# Patient Record
Sex: FEMALE | Race: WHITE | URBAN - METROPOLITAN AREA
[De-identification: names, ages, dates, MRNs, and addresses within clinical notes are randomized per-mention and may not be internally consistent; named-entity substitution may affect disease eponyms.]

---

## 2022-03-18 PROBLEM — G83.4 CAUDA EQUINA SYNDROME (HCC): Status: ACTIVE | Noted: 2017-11-07

## 2022-03-19 PROBLEM — R94.6 ABNORMAL THYROID FUNCTION TEST: Status: ACTIVE | Noted: 2019-12-02

## 2022-03-19 PROBLEM — E23.0 PITUITARY INSUFFICIENCY (HCC): Status: ACTIVE | Noted: 2020-02-05

## 2022-04-19 PROBLEM — R55 SYNCOPE AND COLLAPSE: Status: ACTIVE | Noted: 2022-04-19

## 2022-06-15 DIAGNOSIS — E03.8 CENTRAL HYPOTHYROIDISM: Primary | ICD-10-CM

## 2022-08-16 LAB
ALT SERPL-CCNC: 30 U/L (ref 12–65)
ANION GAP SERPL CALC-SCNC: 9 MMOL/L (ref 7–16)
BASOPHILS # BLD: 0 K/UL (ref 0–0.2)
BASOPHILS NFR BLD: 0 % (ref 0–2)
BUN SERPL-MCNC: 10 MG/DL (ref 8–23)
CALCIUM SERPL-MCNC: 9.4 MG/DL (ref 8.3–10.4)
CHLORIDE SERPL-SCNC: 95 MMOL/L (ref 98–107)
CO2 SERPL-SCNC: 24 MMOL/L (ref 21–32)
CREAT SERPL-MCNC: 0.9 MG/DL (ref 0.6–1)
DIFFERENTIAL METHOD BLD: ABNORMAL
EOSINOPHIL # BLD: 0.1 K/UL (ref 0–0.8)
EOSINOPHIL NFR BLD: 1 % (ref 0.5–7.8)
ERYTHROCYTE [DISTWIDTH] IN BLOOD BY AUTOMATED COUNT: 12.8 % (ref 11.9–14.6)
GLUCOSE SERPL-MCNC: 138 MG/DL (ref 65–100)
HCT VFR BLD AUTO: 37.5 % (ref 35.8–46.3)
HGB BLD-MCNC: 12.6 G/DL (ref 11.7–15.4)
IMM GRANULOCYTES # BLD AUTO: 0 K/UL (ref 0–0.5)
IMM GRANULOCYTES NFR BLD AUTO: 0 % (ref 0–5)
LYMPHOCYTES # BLD: 1.5 K/UL (ref 0.5–4.6)
LYMPHOCYTES NFR BLD: 21 % (ref 13–44)
MCH RBC QN AUTO: 30.2 PG (ref 26.1–32.9)
MCHC RBC AUTO-ENTMCNC: 33.6 G/DL (ref 31.4–35)
MCV RBC AUTO: 89.9 FL (ref 79.6–97.8)
MONOCYTES # BLD: 0.4 K/UL (ref 0.1–1.3)
MONOCYTES NFR BLD: 6 % (ref 4–12)
NEUTS SEG # BLD: 5.1 K/UL (ref 1.7–8.2)
NEUTS SEG NFR BLD: 72 % (ref 43–78)
NRBC # BLD: 0 K/UL (ref 0–0.2)
PLATELET # BLD AUTO: 412 K/UL (ref 150–450)
PMV BLD AUTO: 9.2 FL (ref 9.4–12.3)
POTASSIUM SERPL-SCNC: 4.9 MMOL/L (ref 3.5–5.1)
RBC # BLD AUTO: 4.17 M/UL (ref 4.05–5.2)
SODIUM SERPL-SCNC: 128 MMOL/L (ref 136–145)
WBC # BLD AUTO: 7.2 K/UL (ref 4.3–11.1)

## 2022-08-17 LAB
CARBAMAZEPINE FREE SERPL-MCNC: 2.5 UG/ML (ref 0.6–4.2)
CARBAMAZEPINE, TOTAL: 12.2 UG/ML (ref 4–12)

## 2022-08-18 ENCOUNTER — OFFICE VISIT (OUTPATIENT)
Dept: CARDIOLOGY CLINIC | Age: 62
End: 2022-08-18
Payer: COMMERCIAL

## 2022-08-18 VITALS
HEIGHT: 63 IN | BODY MASS INDEX: 32.76 KG/M2 | HEART RATE: 62 BPM | DIASTOLIC BLOOD PRESSURE: 70 MMHG | SYSTOLIC BLOOD PRESSURE: 122 MMHG | WEIGHT: 184.9 LBS

## 2022-08-18 DIAGNOSIS — I10 ESSENTIAL HYPERTENSION: ICD-10-CM

## 2022-08-18 DIAGNOSIS — E78.01 FAMILIAL HYPERCHOLESTEROLEMIA: ICD-10-CM

## 2022-08-18 DIAGNOSIS — E78.01 FAMILIAL HYPERCHOLESTEROLEMIA: Primary | ICD-10-CM

## 2022-08-18 DIAGNOSIS — I95.1 ORTHOSTATIC HYPOTENSION: ICD-10-CM

## 2022-08-18 DIAGNOSIS — E87.1 HYPONATREMIA SYNDROME: ICD-10-CM

## 2022-08-18 LAB
CHOLEST SERPL-MCNC: 225 MG/DL
HDLC SERPL-MCNC: 89 MG/DL (ref 40–60)
HDLC SERPL: 2.5 {RATIO}
LDLC SERPL CALC-MCNC: 113 MG/DL
TRIGL SERPL-MCNC: 115 MG/DL (ref 35–150)
VLDLC SERPL CALC-MCNC: 23 MG/DL (ref 6–23)

## 2022-08-18 PROCEDURE — 99214 OFFICE O/P EST MOD 30 MIN: CPT | Performed by: INTERNAL MEDICINE

## 2022-08-18 RX ORDER — ATORVASTATIN CALCIUM 80 MG/1
80 TABLET, FILM COATED ORAL DAILY
Qty: 90 TABLET | Refills: 3 | Status: SHIPPED | OUTPATIENT
Start: 2022-08-18

## 2022-08-18 RX ORDER — GLIPIZIDE 5 MG/1
5 TABLET ORAL
COMMUNITY
Start: 2022-06-27

## 2022-08-18 ASSESSMENT — ENCOUNTER SYMPTOMS: SHORTNESS OF BREATH: 0

## 2022-08-18 NOTE — PROGRESS NOTES
[facility] CARDIOLOGY  [address], [address]  PHONE: [phone]      22    NAME:  Jun Camarena  : 1960  MRN: [id number]         SUBJECTIVE:   Jun Camarena is a 57 y.o. female seen for a follow up visit regarding the following:     Chief Complaint   Patient presents with    Results     echo            HPI:  Follow up  Results (echo)   . Returns to follow up syncope d/t severe OH with abnormal spinal reflexes, familial hyperlipidemia. She's been good overall. She had one more episode since last visit, felt it coming on and was able to sit down. Wearing compression socks at home, walked a few minutes        PAST CARDIAC HISTORY:   2022- normal NST and EF   30 day monitor - normal tracing. Symptoms correlate with NSR   Echo - normal SF, valves. ind DF  Severe orthostatic hypotension d/t abnormal spinal reflexes/cauda equina syndrome             Key CAD CHF Meds            atorvastatin (LIPITOR) 80 MG tablet (Taking)    Sig - Route: Take 1 tablet by mouth daily - Oral    amLODIPine (NORVASC) 5 MG tablet (Taking)    Class: Historical Med    atenolol (TENORMIN) 50 MG tablet (Taking)    Class: Historical Med              Past Medical History, Past Surgical History, Family history, Social History, and Medications were all reviewed with the patient today and updated as necessary. Prior to Admission medications    Medication Sig Start Date End Date Taking?  Authorizing Provider   glipiZIDE (GLUCOTROL) 5 MG tablet 5 mg 2 times daily (before meals) 22  Yes Historical Provider, MD   atorvastatin (LIPITOR) 80 MG tablet Take 1 tablet by mouth daily 22  Yes Sara Jacob MD   amLODIPine (NORVASC) 5 MG tablet Take 10 mg by mouth daily 20  Yes Ar Automatic Reconciliation   atenolol (TENORMIN) 50 MG tablet Take 50 mg by mouth daily 20  Yes Ar Automatic Reconciliation   buPROPion (WELLBUTRIN XL) 150 MG extended release tablet Take 300 mg by mouth 20  Yes Ar Automatic Reconciliation   carBAMazepine (TEGRETOL XR) 200 MG extended release tablet Take 200 mg by mouth 3 times daily   Yes Ar Automatic Reconciliation   escitalopram (LEXAPRO) 20 MG tablet Take 20 mg by mouth 3 times daily 20  Yes Ar Automatic Reconciliation   levothyroxine (SYNTHROID) 100 MCG tablet Take 100 mcg by mouth every morning (before breakfast) 11/3/21  Yes Ar Automatic Reconciliation   lidocaine (LIDODERM) 5 % Place onto the skin every 24 hours   Yes Ar Automatic Reconciliation   magnesium oxide (MAG-OX) 400 (240 Mg) MG tablet Take 400 mg by mouth daily   Yes Ar Automatic Reconciliation   melatonin 5 MG TABS tablet Take 10 mg by mouth   Yes Ar Automatic Reconciliation   promethazine (PHENERGAN) 25 MG tablet Take 25 mg by mouth every 6 hours as needed 20  Yes Ar Automatic Reconciliation   Rimegepant Sulfate (NURTEC) 75 MG TBDP 1 tablet every other day 21  Yes Ar Automatic Reconciliation   tiZANidine (ZANAFLEX) 4 MG capsule Take 4 mg by mouth 3 times daily 5/28/15  Yes Ar Automatic Reconciliation   valACYclovir (VALTREX) 500 MG tablet Take 500 mg by mouth 2 times daily 20  Yes Ar Automatic Reconciliation   vitamin D3 (CHOLECALCIFEROL) 125 MCG (5000 UT) TABS tablet Take 4,000 Units by mouth daily 14   Ar Automatic Reconciliation     Allergies   Allergen Reactions    Duloxetine Other (See Comments)     tachycardia    Theophylline Palpitations     Passed out last time taken     Past Medical History:   Diagnosis Date    Cauda equina syndrome (HCC)     Central hypothyroidism     Depression     Empty sella ([facility] [address]     History of tethered spinal cord     Hypercholesteremia     Hypertension     Migraine     Neurogenic bladder     Optic neuritis     Type 2 diabetes mellitus [facility])      Past Surgical History:   Procedure Laterality Date    BACK SURGERY  , 2014, and 2015     SECTION  1989    CHOLECYSTECTOMY  2011    KNEE ARTHROSCOPY Right 2011     Family History   Problem Relation Age of Onset    Heart Disease Mother     Hypertension Mother     Thyroid Cancer Neg Hx     Breast Cancer Neg Hx     Pacemaker Brother     Hypertension Brother     Stroke Brother     Thyroid Disease Sister         nodules treated with thyroidectomy     Alzheimer's Disease Father     Stroke Father     Hypertension Father     Heart Disease Father     Diabetes Father     Pacemaker Mother     Dementia Mother      Social History     Tobacco Use    Smoking status: Never    Smokeless tobacco: Never   Substance Use Topics    Alcohol use: Yes     Alcohol/week: 0.0 standard drinks       ROS:    Review of Systems   Cardiovascular:  Negative for chest pain. Respiratory:  Negative for shortness of breath. Neurological:  Positive for light-headedness. PHYSICAL EXAM:   /70   Pulse 62   Ht 5' 3\" (1.6 m)   Wt 184 lb 14.4 oz (83.9 kg) Comment: with shoes  BMI 32.75 kg/m²      Wt Readings from Last 3 Encounters:   08/18/22 184 lb 14.4 oz (83.9 kg)   05/18/22 175 lb (79.4 kg)   05/10/22 175 lb (79.4 kg)     BP Readings from Last 3 Encounters:   08/18/22 122/70   05/18/22 132/80   05/10/22 (!) 140/80     Pulse Readings from Last 3 Encounters:   08/18/22 62   05/18/22 62   04/05/22 72           Physical Exam  Constitutional:       Appearance: Normal appearance. HENT:      Head: Normocephalic and atraumatic. Eyes:      Conjunctiva/sclera: Conjunctivae normal.   Neck:      Vascular: No carotid bruit. Cardiovascular:      Rate and Rhythm: Normal rate and regular rhythm. Heart sounds: No murmur heard. No friction rub. No gallop. Pulmonary:      Effort: No respiratory distress. Breath sounds: No wheezing or rales. Musculoskeletal:         General: No swelling. Cervical back: Neck supple. Skin:     General: Skin is warm and dry. Neurological:      General: No focal deficit present. Mental Status: She is alert.    Psychiatric:         Mood and Affect: Mood normal.         Behavior: Behavior normal.       Medical problems and test results were reviewed with the patient today. DATA REVIEW      BMP  Lab Results   Component Value Date/Time     08/16/2022 10:33 AM    K 4.9 08/16/2022 10:33 AM    CL 95 08/16/2022 10:33 AM    CO2 24 08/16/2022 10:33 AM    BUN 10 08/16/2022 10:33 AM    CREATININE 0.90 08/16/2022 10:33 AM    GLUCOSE 138 08/16/2022 10:33 AM    CALCIUM 9.4 08/16/2022 10:33 AM          EKG        CXR/IMAGING        DEVICE INTERROGATION        OUTSIDE RECORDS REVIEW    Records from outside providers have been reviewed and summarized as noted in the HPI, past history and data review sections of this note, and reviewed with patient. .       ASSESSMENT and PLAN    Roseann Evans was seen today for results. Diagnoses and all orders for this visit:    Familial hypercholesterolemia  -     Lipid Panel; Future    Orthostatic hypotension    Essential hypertension    Hyponatremia syndrome    Other orders  -     atorvastatin (LIPITOR) 80 MG tablet; Take 1 tablet by mouth daily        IMPRESSION:    She's managed well, permissive hypertension to 150-160 if necessary to prevent significant drops with syncope. Continue conservative measures. She previously asked if I would help manage her familial dyslipidemia. Happy to help. Continue high intensity statin, check labs today. Very likely will require additional therapy. Results and recommendations via my chart    She reports chronic hyponatremia on tegretol. Return in about 6 months (around 2/18/2023). Thank you for allowing me to participate in this patient's care. Please call or contact me if there are any questions or concerns regarding the above.       Shefali Chaves MD  08/18/22  8:46 AM

## 2022-08-22 LAB — 25(OH)D3+25(OH)D2 SERPL-MCNC: 50.7 NG/ML (ref 30–100)

## 2022-10-27 ENCOUNTER — TELEPHONE (OUTPATIENT)
Dept: ENDOCRINOLOGY | Age: 62
End: 2022-10-27

## 2022-10-27 DIAGNOSIS — E03.8 CENTRAL HYPOTHYROIDISM: Primary | ICD-10-CM

## 2022-10-27 NOTE — TELEPHONE ENCOUNTER
I need to know if labs need done before my appointment next week and if so I need my lab order mailed to me.

## 2023-03-01 DIAGNOSIS — E03.8 CENTRAL HYPOTHYROIDISM: ICD-10-CM

## 2023-03-02 LAB — T4 FREE SERPL-MCNC: 0.9 NG/DL (ref 0.78–1.46)

## 2023-03-07 ENCOUNTER — OFFICE VISIT (OUTPATIENT)
Dept: ENDOCRINOLOGY | Age: 63
End: 2023-03-07
Payer: COMMERCIAL

## 2023-03-07 VITALS
WEIGHT: 189 LBS | SYSTOLIC BLOOD PRESSURE: 120 MMHG | OXYGEN SATURATION: 98 % | DIASTOLIC BLOOD PRESSURE: 80 MMHG | BODY MASS INDEX: 33.48 KG/M2 | HEART RATE: 96 BPM

## 2023-03-07 DIAGNOSIS — E03.8 CENTRAL HYPOTHYROIDISM: Primary | ICD-10-CM

## 2023-03-07 DIAGNOSIS — E11.65 TYPE 2 DIABETES MELLITUS WITH HYPERGLYCEMIA, WITHOUT LONG-TERM CURRENT USE OF INSULIN (HCC): ICD-10-CM

## 2023-03-07 PROCEDURE — 1123F ACP DISCUSS/DSCN MKR DOCD: CPT | Performed by: INTERNAL MEDICINE

## 2023-03-07 PROCEDURE — 99214 OFFICE O/P EST MOD 30 MIN: CPT | Performed by: INTERNAL MEDICINE

## 2023-03-07 RX ORDER — LEVOTHYROXINE SODIUM 0.12 MG/1
125 TABLET ORAL
Qty: 90 TABLET | Refills: 3 | Status: SHIPPED | OUTPATIENT
Start: 2023-03-07

## 2023-03-07 ASSESSMENT — ENCOUNTER SYMPTOMS
DIARRHEA: 1
CONSTIPATION: 0

## 2023-03-07 NOTE — PROGRESS NOTES
Nahed Simon MD, [address]            Reason for visit: Follow-up of secondary hypothyroidism      ASSESSMENT AND PLAN:    1. 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 Assessment of other anterior pituitary function was normal.  Free T4 is low-normal on levothyroxine 100 mcg daily. I will increase her levothyroxine dose as below. I will have her recheck free T4 in 6 weeks and then again prior to the next appointment with me. The adequacy of treatment should be monitored by checking free T4 only. TSH is unreliable and should not be checked, as it confuses the clinical picture. - levothyroxine (SYNTHROID) 125 MCG tablet; Take 1 tablet by mouth every morning (before breakfast)  Dispense: 90 tablet; Refill: 3  - T4, Free; Future  - T4, Free; Future    2. Type 2 diabetes mellitus with hyperglycemia, without long-term current use of insulin [facility])  Ms. Pedro Sommers has diabetes which appears to be well controlled (though her most recent hemoglobin A1c was 1 year ago). She currently takes glipizide. She is understandably frustrated about weight gain. Glipizide of course predisposes to weight gain (as well as hypoglycemia). It would be reasonable to replace glipizide with a weekly GLP-1 receptor agonist or daily SGLT2 inhibitor if she prefers to avoid injections. Defer to Dr. Derek Kim. Follow-up and Dispositions    Return in about 4 months (around 2023). History of Present Illness:    THYROID DYSFUNCTION  Kathryn Morfin is seen for follow-up of abnormal thyroid function tests, first noted in 2018 but brought to her attention in 2019. Her daughter Charlie Maki ( 1989) was evaluated by Dr. Ever Castellanos in this office for abnormal thyroid function tests. She was found to have low T3 uptake attributable to oral contraceptive therapy. She is biochemically euthyroid.      Current symptoms: See review of systems below     Prior treatment: Levothyroxine 50 mcg daily was started by Dr. Mariama Galeano 7/10/2019. Her dose was increased to 100 mcg daily 2019. Levothyroxine was discontinued 2019. Levothyroxine 100 mcg daily was resumed 2020. Pertinent labs:  2011: TSH 2.146, free T4 0.9.  2013: TSH 1.858.  10/23/2017: TSH 3.150.  2018: TSH 5.350.    10/16/2018: Free T4 0.83.  2019: TSH 2.920, T4 4.2, free thyroxine index 0.6, T3 uptake 15%. 2019: TSH 5.680, free T4 0.63, T3 uptake 17%. 9/10/2019: TSH 0.302, free T4 0.80, T3 uptake 18%. 2019: TSH 0.066, free T4 1.16, free T4 by dialysis 1.3, T4 8.0, T3 91, T3 uptake 23%, free thyroxine index 1.8, thyroid binding globulin 26, LH 54.7, .3, estradiol less than 5.0, prolactin 5.0.  2020: TSH 2.450, free T4 0.76, free T4 by dialysis 0.61.  3/10/2020 at 8:33 AM: TSH 2.450, T3 90, cortisol 25.6, ACTH 48.8, prolactin 11.2, insulin like growth factor I 119, estradiol less than 5.0, LH 46.4, FSH 98.0.  2020: Free T4 1.13.  2021: Free T4 0.89.  2021: Free T4 1.08.  10/29/2021: Free T4 0.94.  2022: Free T4 1.10.  3/1/2023: Free T4 0.9. [facility] Pituitary measures 1.5 x 1.2 x 0.5 cm. Partial empty sella. Unremarkable optic chiasm. Review of Systems   Constitutional:  Positive for fatigue and unexpected weight change (gained 14 pounds in 9-10 months). Cardiovascular:  Negative for palpitations. Gastrointestinal:  Positive for diarrhea. Negative for constipation. Neurological:  Positive for headaches. Psychiatric/Behavioral:  Positive for sleep disturbance. /80   Pulse 96   Wt 189 lb (85.7 kg)   SpO2 98%   BMI 33.48 kg/m²   Wt Readings from Last 3 Encounters:   23 189 lb (85.7 kg)   22 184 lb 14.4 oz (83.9 kg)   22 175 lb (79.4 kg)       Physical Exam  HENT:      Head: Normocephalic.    Neck:      Thyroid: No thyroid mass or thyromegaly. Cardiovascular:      Rate and Rhythm: Normal rate. Pulmonary:      Effort: No respiratory distress. Breath sounds: Normal breath sounds. Neurological:      Mental Status: She is alert. Psychiatric:         Mood and Affect: Mood normal.         Behavior: Behavior normal.       Orders Placed This Encounter   Procedures    T4, Free     Standing Status:   Future     Standing Expiration Date:   3/7/2024    T4, Free     Standing Status:   Future     Standing Expiration Date:   3/7/2024         Current Outpatient Medications   Medication Sig Dispense Refill    levothyroxine (SYNTHROID) 125 MCG tablet Take 1 tablet by mouth every morning (before breakfast) 90 tablet 3    glipiZIDE (GLUCOTROL) 5 MG tablet 5 mg 2 times daily (before meals)      atorvastatin (LIPITOR) 80 MG tablet Take 1 tablet by mouth daily 90 tablet 3    amLODIPine (NORVASC) 5 MG tablet Take 10 mg by mouth daily      atenolol (TENORMIN) 50 MG tablet Take 50 mg by mouth daily      buPROPion (WELLBUTRIN XL) 150 MG extended release tablet Take 300 mg by mouth      carBAMazepine (TEGRETOL XR) 200 MG extended release tablet Take 200 mg by mouth 3 times daily      vitamin D3 (CHOLECALCIFEROL) 125 MCG (5000 UT) TABS tablet Take 4,000 Units by mouth daily      escitalopram (LEXAPRO) 20 MG tablet Take 20 mg by mouth 3 times daily      lidocaine (LIDODERM) 5 % Place onto the skin every 24 hours      magnesium oxide (MAG-OX) 400 (240 Mg) MG tablet Take 400 mg by mouth daily      melatonin 5 MG TABS tablet Take 10 mg by mouth      promethazine (PHENERGAN) 25 MG tablet Take 25 mg by mouth every 6 hours as needed      Rimegepant Sulfate (NURTEC) 75 MG TBDP 1 tablet every other day      tiZANidine (ZANAFLEX) 4 MG capsule Take 4 mg by mouth 3 times daily      valACYclovir (VALTREX) 500 MG tablet Take 500 mg by mouth 2 times daily       No current facility-administered medications for this visit.        Nas Piper MD, FACE      Portions of this note were generated with the assistance of voice recognition software. As such, some errors in transcription may be present.

## 2024-01-08 DIAGNOSIS — E03.8 CENTRAL HYPOTHYROIDISM: ICD-10-CM

## 2024-01-08 LAB
25(OH)D3 SERPL-MCNC: 51.7 NG/ML (ref 30–100)
ALT SERPL-CCNC: 25 U/L (ref 12–65)
T4 FREE SERPL-MCNC: 1.1 NG/DL (ref 0.78–1.46)

## 2024-01-09 LAB
ANION GAP SERPL CALC-SCNC: 10 MMOL/L (ref 2–11)
BASOPHILS # BLD: 0 K/UL (ref 0–0.2)
BASOPHILS NFR BLD: 0 % (ref 0–2)
BUN SERPL-MCNC: 9 MG/DL (ref 8–23)
CALCIUM SERPL-MCNC: 9.2 MG/DL (ref 8.3–10.4)
CHLORIDE SERPL-SCNC: 94 MMOL/L (ref 103–113)
CO2 SERPL-SCNC: 25 MMOL/L (ref 21–32)
CREAT SERPL-MCNC: 0.9 MG/DL (ref 0.6–1)
DIFFERENTIAL METHOD BLD: ABNORMAL
EOSINOPHIL # BLD: 0.1 K/UL (ref 0–0.8)
EOSINOPHIL NFR BLD: 1 % (ref 0.5–7.8)
ERYTHROCYTE [DISTWIDTH] IN BLOOD BY AUTOMATED COUNT: 13.1 % (ref 11.9–14.6)
EST. AVERAGE GLUCOSE BLD GHB EST-MCNC: 131 MG/DL
GLUCOSE SERPL-MCNC: 131 MG/DL (ref 65–100)
HBA1C MFR BLD: 6.2 % (ref 4.8–5.6)
HCT VFR BLD AUTO: 39.7 % (ref 35.8–46.3)
HGB BLD-MCNC: 12.5 G/DL (ref 11.7–15.4)
IMM GRANULOCYTES # BLD AUTO: 0.1 K/UL (ref 0–0.5)
IMM GRANULOCYTES NFR BLD AUTO: 1 % (ref 0–5)
LYMPHOCYTES # BLD: 1.5 K/UL (ref 0.5–4.6)
LYMPHOCYTES NFR BLD: 14 % (ref 13–44)
MCH RBC QN AUTO: 29.6 PG (ref 26.1–32.9)
MCHC RBC AUTO-ENTMCNC: 31.5 G/DL (ref 31.4–35)
MCV RBC AUTO: 93.9 FL (ref 82–102)
MONOCYTES # BLD: 0.6 K/UL (ref 0.1–1.3)
MONOCYTES NFR BLD: 6 % (ref 4–12)
NEUTS SEG # BLD: 8.3 K/UL (ref 1.7–8.2)
NEUTS SEG NFR BLD: 78 % (ref 43–78)
NRBC # BLD: 0 K/UL (ref 0–0.2)
PLATELET # BLD AUTO: 495 K/UL (ref 150–450)
PMV BLD AUTO: 9.4 FL (ref 9.4–12.3)
POTASSIUM SERPL-SCNC: 5 MMOL/L (ref 3.5–5.1)
RBC # BLD AUTO: 4.23 M/UL (ref 4.05–5.2)
SODIUM SERPL-SCNC: 129 MMOL/L (ref 136–146)
WBC # BLD AUTO: 10.6 K/UL (ref 4.3–11.1)

## 2024-04-26 DIAGNOSIS — E03.8 CENTRAL HYPOTHYROIDISM: ICD-10-CM

## 2024-04-26 DIAGNOSIS — E03.8 CENTRAL HYPOTHYROIDISM: Primary | ICD-10-CM

## 2024-04-26 LAB — T4 FREE SERPL-MCNC: 1.3 NG/DL (ref 0.9–1.7)

## 2024-04-29 ENCOUNTER — OFFICE VISIT (OUTPATIENT)
Dept: ENDOCRINOLOGY | Age: 64
End: 2024-04-29
Payer: COMMERCIAL

## 2024-04-29 VITALS
HEART RATE: 74 BPM | SYSTOLIC BLOOD PRESSURE: 122 MMHG | WEIGHT: 167 LBS | BODY MASS INDEX: 29.58 KG/M2 | DIASTOLIC BLOOD PRESSURE: 84 MMHG

## 2024-04-29 DIAGNOSIS — E03.8 CENTRAL HYPOTHYROIDISM: Primary | ICD-10-CM

## 2024-04-29 PROCEDURE — G8419 CALC BMI OUT NRM PARAM NOF/U: HCPCS | Performed by: INTERNAL MEDICINE

## 2024-04-29 PROCEDURE — 1090F PRES/ABSN URINE INCON ASSESS: CPT | Performed by: INTERNAL MEDICINE

## 2024-04-29 PROCEDURE — G8427 DOCREV CUR MEDS BY ELIG CLIN: HCPCS | Performed by: INTERNAL MEDICINE

## 2024-04-29 PROCEDURE — 1123F ACP DISCUSS/DSCN MKR DOCD: CPT | Performed by: INTERNAL MEDICINE

## 2024-04-29 PROCEDURE — 1036F TOBACCO NON-USER: CPT | Performed by: INTERNAL MEDICINE

## 2024-04-29 PROCEDURE — 99213 OFFICE O/P EST LOW 20 MIN: CPT | Performed by: INTERNAL MEDICINE

## 2024-04-29 PROCEDURE — 3017F COLORECTAL CA SCREEN DOC REV: CPT | Performed by: INTERNAL MEDICINE

## 2024-04-29 PROCEDURE — G8399 PT W/DXA RESULTS DOCUMENT: HCPCS | Performed by: INTERNAL MEDICINE

## 2024-04-29 RX ORDER — HYDROCODONE BITARTRATE AND ACETAMINOPHEN 10; 325 MG/1; MG/1
1 TABLET ORAL 3 TIMES DAILY
COMMUNITY

## 2024-04-29 RX ORDER — LEVOTHYROXINE SODIUM 0.12 MG/1
125 TABLET ORAL
Qty: 30 TABLET | Refills: 11 | Status: SHIPPED | OUTPATIENT
Start: 2024-04-29

## 2024-04-29 ASSESSMENT — ENCOUNTER SYMPTOMS
DIARRHEA: 0
CONSTIPATION: 1

## 2024-04-29 NOTE — PROGRESS NOTES
UJN Acosta MD, [facility] ENDOCRINOLOGY   AND   THYROID NODULE CLINIC            Reason for visit: Follow-up of secondary hypothyroidism      ASSESSMENT AND PLAN:    1. 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  Assessment of other anterior pituitary function was normal.  Free T4 is mid/high normal on levothyroxine 125 mcg daily.  I will have her continue her current dose and recheck Free T4 prior to the next appointment.  The adequacy of treatment should be monitored by checking free T4 only.  TSH is unreliable in the setting of central hypothyroidism and should not be checked, as it confuses the clinical picture.   - levothyroxine (SYNTHROID) 125 MCG tablet; Take 1 tablet by mouth every morning (before breakfast)  Dispense: 90 tablet; Refill: 3  - T4, Free; Future    Follow-up and Dispositions    Return in about 6 months (around 10/29/2024).           History of Present Illness:    THYROID DYSFUNCTION  Jeni Ambrose is seen for follow-up of abnormal thyroid function tests, first noted in 2018 but brought to her attention in 2019.        Her daughter Katrina Cleveland ( 1989) was evaluated by Dr. Bueno in this office for abnormal thyroid function tests.  She was found to have low T3 uptake attributable to oral contraceptive therapy.  She is biochemically euthyroid.     Current symptoms: See review of systems below     Prior treatment: Levothyroxine 50 mcg daily was started by Dr. Osorio 7/10/2019.  Her dose was increased to 100 mcg daily 2019.  Levothyroxine was discontinued 2019.  Levothyroxine 100 mcg daily was resumed 2020.  Her dose has been further adjusted as follows:  -125 mcg daily 3/7/2023     Pertinent labs:  2011: TSH 2.146, free T4 0.9.  2013: TSH 1.858.  10/23/2017: TSH 3.150.  2018: TSH 5.350.    10/16/2018: Free T4 0.83.  2019: TSH 2.920, T4 4.2, free thyroxine index 0.6, T3 uptake

## 2024-05-03 LAB
Lab: 9.2
Lab: NORMAL
REFERENCE LAB: NORMAL

## 2025-02-04 DIAGNOSIS — E03.8 CENTRAL HYPOTHYROIDISM: ICD-10-CM

## 2025-02-04 LAB — T4 FREE SERPL-MCNC: 1.6 NG/DL (ref 0.9–1.7)

## 2025-02-04 ASSESSMENT — ENCOUNTER SYMPTOMS: DIARRHEA: 0

## 2025-02-04 NOTE — PROGRESS NOTES
JUN Acosta MD, [facility] ENDOCRINOLOGY   AND   THYROID NODULE CLINIC            Reason for visit: Follow-up of secondary hypothyroidism      ASSESSMENT AND PLAN:    1. 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  Assessment of other anterior pituitary function was normal.  Free T4 is mid/high normal on levothyroxine 125 mcg daily, as intended.  I will have her continue her current dose and recheck free T4 prior to the next appointment.  The adequacy of treatment should be monitored by checking free T4 only.  TSH is unreliable in the setting of central hypothyroidism and should not be checked, as it confuses the clinical picture.   - levothyroxine (SYNTHROID) 125 MCG tablet; Take 1 tablet by mouth every morning (before breakfast)  Dispense: 90 tablet; Refill: 3  - T4, Free; Future    Follow-up and Dispositions    Return in about 1 year (around 2026).             History of Present Illness:    THYROID DYSFUNCTION  Jeni Ambrose is seen for follow-up of abnormal thyroid function tests, first noted in 2018 but brought to her attention in 2019.        Her daughter Katrina Cleveland ( 1989) was evaluated by Dr. Bueno in this office for abnormal thyroid function tests.  She was found to have low T3 uptake attributable to oral contraceptive therapy.  She is biochemically euthyroid.     Current symptoms: See review of systems below     Prior treatment: Levothyroxine 50 mcg daily was started by Dr. Osorio 7/10/2019.  Her dose was increased to 100 mcg daily 2019.  Levothyroxine was discontinued 2019.  Levothyroxine 100 mcg daily was resumed 2020.  Her dose has been further adjusted as follows:  -125 mcg daily 3/7/2023     Pertinent labs:  2011: TSH 2.146, free T4 0.9.  2013: TSH 1.858.  10/23/2017: TSH 3.150.  2018: TSH 5.350.    10/16/2018: Free T4 0.83.  2019: TSH 2.920, T4 4.2, free thyroxine index

## 2025-02-05 ENCOUNTER — OFFICE VISIT (OUTPATIENT)
Dept: ENDOCRINOLOGY | Age: 65
End: 2025-02-05
Payer: MEDICARE

## 2025-02-05 VITALS
OXYGEN SATURATION: 98 % | BODY MASS INDEX: 30.65 KG/M2 | HEART RATE: 71 BPM | WEIGHT: 173 LBS | SYSTOLIC BLOOD PRESSURE: 122 MMHG | DIASTOLIC BLOOD PRESSURE: 80 MMHG | HEIGHT: 63 IN

## 2025-02-05 DIAGNOSIS — E03.8 CENTRAL HYPOTHYROIDISM: Primary | ICD-10-CM

## 2025-02-05 PROCEDURE — 1159F MED LIST DOCD IN RCRD: CPT | Performed by: INTERNAL MEDICINE

## 2025-02-05 PROCEDURE — 3017F COLORECTAL CA SCREEN DOC REV: CPT | Performed by: INTERNAL MEDICINE

## 2025-02-05 PROCEDURE — 4004F PT TOBACCO SCREEN RCVD TLK: CPT | Performed by: INTERNAL MEDICINE

## 2025-02-05 PROCEDURE — G2211 COMPLEX E/M VISIT ADD ON: HCPCS | Performed by: INTERNAL MEDICINE

## 2025-02-05 PROCEDURE — G8399 PT W/DXA RESULTS DOCUMENT: HCPCS | Performed by: INTERNAL MEDICINE

## 2025-02-05 PROCEDURE — G8427 DOCREV CUR MEDS BY ELIG CLIN: HCPCS | Performed by: INTERNAL MEDICINE

## 2025-02-05 PROCEDURE — G8419 CALC BMI OUT NRM PARAM NOF/U: HCPCS | Performed by: INTERNAL MEDICINE

## 2025-02-05 PROCEDURE — 1123F ACP DISCUSS/DSCN MKR DOCD: CPT | Performed by: INTERNAL MEDICINE

## 2025-02-05 PROCEDURE — 1090F PRES/ABSN URINE INCON ASSESS: CPT | Performed by: INTERNAL MEDICINE

## 2025-02-05 PROCEDURE — 99213 OFFICE O/P EST LOW 20 MIN: CPT | Performed by: INTERNAL MEDICINE

## 2025-02-05 RX ORDER — PANTOPRAZOLE SODIUM 40 MG/1
40 TABLET, DELAYED RELEASE ORAL 2 TIMES DAILY
COMMUNITY
Start: 2025-02-03 | End: 2026-01-29

## 2025-02-05 RX ORDER — LEVOTHYROXINE SODIUM 125 UG/1
125 TABLET ORAL
Qty: 30 TABLET | Refills: 11 | Status: SHIPPED | OUTPATIENT
Start: 2025-02-05

## 2025-02-05 ASSESSMENT — ENCOUNTER SYMPTOMS: CONSTIPATION: 0

## 2025-02-06 LAB
Lab: NORMAL
Lab: NORMAL
REFERENCE LAB: NORMAL